# Patient Record
Sex: MALE | Race: WHITE
[De-identification: names, ages, dates, MRNs, and addresses within clinical notes are randomized per-mention and may not be internally consistent; named-entity substitution may affect disease eponyms.]

---

## 2018-05-02 ENCOUNTER — HOSPITAL ENCOUNTER (EMERGENCY)
Dept: HOSPITAL 45 - C.EDB | Age: 63
Discharge: HOME | End: 2018-05-02
Payer: COMMERCIAL

## 2018-05-02 VITALS
BODY MASS INDEX: 24.22 KG/M2 | WEIGHT: 154.32 LBS | HEIGHT: 67.01 IN | HEIGHT: 67.01 IN | BODY MASS INDEX: 24.22 KG/M2 | WEIGHT: 154.32 LBS

## 2018-05-02 VITALS — SYSTOLIC BLOOD PRESSURE: 151 MMHG | DIASTOLIC BLOOD PRESSURE: 96 MMHG | OXYGEN SATURATION: 95 % | HEART RATE: 92 BPM

## 2018-05-02 VITALS — TEMPERATURE: 97.88 F

## 2018-05-02 DIAGNOSIS — Z23: ICD-10-CM

## 2018-05-02 DIAGNOSIS — S50.812A: ICD-10-CM
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
## 2018-05-02 DIAGNOSIS — F17.210: ICD-10-CM

## 2018-05-02 DIAGNOSIS — S80.12XA: Primary | ICD-10-CM
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
## 2018-05-02 DIAGNOSIS — S70.312A: ICD-10-CM

## 2018-05-02 NOTE — EMERGENCY ROOM VISIT NOTE
History


Report prepared by Radhaibjennifer:  Dale Green


Under the Supervision of:  Dr. Roosevelt Asher M.D.


First contact with patient:  09:50


Chief Complaint:  LEG PAIN,LEG INJURY


Stated Complaint:  POSSIBLE BROKEN LEG - LEFT





History of Present Illness


The patient is a 63 year old male who presents to the Emergency Room with 

complaints of constant left leg pain beginning 45 minutes ago. He states that 

he was sawing logs when one of them fell onto his leg. He states that the log 

was partially rotten, and rolled out of the hold once it was partially cut. The 

patient states that he was hit by a sunny on the log. He states that the 

sunny cut his right and left forearms during the event. He has been unable to 

walk since the episode due to pain. The patient states that his toes were 

tingling following the injury, but this is improving. He also reports having 

the wind knocked out of him due to the pain in his leg. He rates his pain as an 

8.5/10 in severity. The patient denies chest pain, abdominal pain, or headache. 

He was wearing a hard hat and gloves.





   Source of History:  patient


   Onset:  45 minutes ago


   Position:  leg (left)


   Symptom Intensity:  8.5/10


   Timing:  constant


   Associated Symptoms:  No headache, No chest pain, No abdominal pain


Note:


The patient states that his toes were tingling following the injury, but this 

is improving.





Review of Systems


See HPI for pertinent positives and negatives.  A total of ten systems were 

reviewed and were otherwise negative.





Past Medical & Surgical


Medical Problems:


(1) No Known Active Medical Problems








Family History


No pertinent family history stated.





Social History


Smoking Status:  Current Every Day Smoker


Alcohol Use:  occasionally





Current/Historical Medications


Scheduled PRN


Oxycodone Ir (Roxicodone Ir), 1-2 TAB PO Q4H PRN for Pain





Allergies


Coded Allergies:  


     No Known Allergies (Unverified , 5/2/18)





Physical Exam


Vital Signs











  Date Time  Temp Pulse Resp B/P (MAP) Pulse Ox O2 Delivery O2 Flow Rate FiO2


 


5/2/18 11:17  92 16 151/96 95   


 


5/2/18 09:47 36.6 81 18 152/81 98 Room Air  











Physical Exam


GENERAL: Awake, alert, non-ill appearing, in no acute distress


HEAD: Normocephalic, atraumatic. No nina sign. No raccoon eyes.


EYES: Normal conjunctiva. PERRL.


EARS: External ears normal. Right TM normal. Left TM normal.


NOSE: Atraumatic


OROPHARYNX: Lips, tongue, and mucosa unremarkable. No erythema or exudate.


NECK: No tracheal deviation or JVD. No posterior midline tenderness. No step 

offs noted.


RESPIRATORY: CTA bilaterally.  Breath sounds equal.  No wheezes. No rhonchi. 

Normal respiratory effort.


CARDIAC: Normal rate, normal rhythm. No murmurs. No rubs.


ABDOMEN: Inspection reveals no abnormalities. Soft, non distended. No 

tenderness to palpation. No hernias.


BACK: No midline step offs or tenderness to palpation. Unremarkable.


PELVIS: Stable to rock.


SKIN: Normal.


LYMPH: No adenopathy.


MUSCULOSKELETAL: Small abrasion to the right wrist on the volar aspect. No bony 

deformity. Abrasion to the left proximal forearm on the volar and dorsal 

aspect. Right lower extremity is atraumatic. Left hip is non-tender with range 

of motion. Abrasion to the lateral left thigh down to the proximal lower leg. 

Maximal tenderness to the distal left quadriceps/IT band region. No knee joint 

tenderness or effusion. Remainder of left lower extremity is atraumatic. 


NEURO: GCS 15. Normal sensorium. No sensory or motor deficits noted.





Medical Decision & Procedures


ER Provider


Diagnostic Interpretation:


Radiology results as stated below per my review and radiologist interpretation: 





L FEMUR 2 VIEWS ROUTINE





DISCUSSION: Significant degenerative change left hip. Subchondral cystic


formation is seen at the femoral head as well as acetabulum. The femur


specifically is unremarkable. Cortical margins are intact. There is no abnormal


periosteal reaction. There is no evidence for soft tissue swelling.





IMPRESSION: 


1. Degenerative change left hip.


2. No acute process of the remainder the femur.





The above report was generated using voice recognition software.  It may contain


grammatical, syntax or spelling errors.





Electronically signed by:  Zack Valdivia M.D.


5/2/2018 10:34 AM





Medications Administered











 Medications


  (Trade)  Dose


 Ordered  Sig/Pietro


 Route  Start Time


 Stop Time Status Last Admin


Dose Admin


 


 Acetaminophen/


 Hydrocodone Bitart


  (Norco 5/325 Tab)  1 tab  NOW  STAT


 PO  5/2/18 09:59


 5/2/18 10:00 DC 5/2/18 10:11


1 TAB


 


 Diphtheria/


 Pertussis/Tetanus


 Vacc


  (Adacel Inj)  0.5 ml  ONCE ONCE


 IM.  5/2/18 10:00


 5/2/18 10:01 DC 5/2/18 10:13


0.5 ML











ED Course


0952: The patient was evaluated in room A4B. A complete history and physical 

exam was performed.\





0959: Ordered Norco 5/325 Tab PO.





1000: Ordered Adacel Inj 0.5 mL IM. 





1102: I reevaluated the patient. Discussed results and discharge instructions: 

he verbalized understanding and agreement. The patient is ready for discharge.





Medical Decision


Prior records reviewed and summarized above.





Triage Nursing notes reviewed and agree them.





Additional history obtained from his coworker.





The patient's history was concerning for traumatic injury.  





Differential diagnosis:


Etiologies such as fracture, dislocation, neurovascular compromise, compartment 

syndrome, soft tissue injury,  as well as others were entertained.  





Physical examination:


Consistent with an isolated extremity injuries.  The upper extremity for minor.





ER treatment provided:


Oral Norco


Ice packs


On reassessment the patient felt better.





Diagnostics interpreted by me:


Labs deferred.


Imaging studies:


Xrays as above.





Patient is doing well.  He has abrasions and a moderate contusion to the 

lateral quadriceps area.  There is no significant hematoma at this time.  He 

has crutches at home. 


By the evaluation outlined above emergent etiologies such as  fracture, 

dislocation, neurovascular compromise, compartment syndrome, infections, as 

well as others were deemed relatively unlikely.  


The patient was informed about the findings as listed above.  All questions 

were answered and he was very pleased with the treatment.  Return instructions 

were outlined and the patient was discharged in stable condition.





Prescription management:


Oxy IR





Referral: 


Follow-up with your primary care physician in 2 to 3 days for a recheck of your 

current condition.





[place] Drug Monitoring Program


Search Results:  patient reviewed within database, no issues identified





Medication Reconcilliation


Current Medication List:  was personally reviewed by me





Blood Pressure Screening


Patient's blood pressure:  Elevated blood pressure


Blood pressure disposition:  Referred to PCP





Impression





 Primary Impression:  


 Contusion of left leg


 Additional Impressions:  


 Abrasion of left leg


 Arm contusion


 Arm abrasion





Scribe Attestation


The scribe's documentation has been prepared under my direction and personally 

reviewed by me in its entirety. I confirm that the note above accurately 

reflects all work, treatment, procedures, and medical decision making performed 

by me.





Departure Information


Dispostion


Home / Self-Care





Prescriptions





Oxycodone Ir (Roxicodone Ir) 5 Mg Tab


1-2 TAB PO Q4H Y for Pain, #12 TAB


   Prov: Roosevelt Asher MD         5/2/18





Forms


HOME CARE DOCUMENTATION FORM,                                                 

               IMPORTANT VISIT INFORMATION





Patient Instructions


My [facility]





Additional Instructions





DO NOT drive, drink alcohol, operate machinery, or perform dangerous activities 

today.  You were given medications in the ER that can affect your ability to 

safely function or operate a vehicle.





Oxycodone (OxyIR) 5mg: Take 1-2 pills every four hours for breakthrough pain.  

Avoid alcohol, operating machinery or dangerous equipment, working on ladders 

or roofs, DRIVING, or situations where being under the influence may be 

dangerous.  It is recommended to use an over-the-counter stool softener such as 

Colace, 100mg twice daily while taking this medication to avoid constipation. 





Ibuprofen(Motrin, Advil) may be used for fever or pain.  Use 600mg every six 

hours as needed.  Take with food.  Avoid using more than 2400mg in a 24 hour 

period.  Do not use 2400mg per day for more than three consecutive days without 

physician direction.  Prolonged inappropriate use can lead to stomach upset or 

ulcers.  


(AND/OR)


Acetaminophen(Tylenol) may be used for fever or pain.  Use 1000mg every six 

hours as needed.  Avoid using more than 4000mg in a 24 hour period.  





Ice compresses for 20 minutes at a time four times daily for 2-3 days.





Use the crutches as instructed. 





Rest and elevate your injury.





Return to the ER immediately for any numbness, tingling, severe pain, extreme 

swelling in the extremity or as needed.





Follow-up with your primary care physician in 2 to 3 days for a recheck of your 

current condition.





Problem Qualifiers

## 2018-05-02 NOTE — DIAGNOSTIC IMAGING REPORT
L FEMUR 2 VIEWS ROUTINE



CLINICAL HISTORY: pain,left lateral impact from log pain



COMPARISON: None.



DISCUSSION: Significant degenerative change left hip. Subchondral cystic

formation is seen at the femoral head as well as acetabulum. The femur

specifically is unremarkable. Cortical margins are intact. There is no abnormal

periosteal reaction. There is no evidence for soft tissue swelling.



IMPRESSION: 

1. Degenerative change left hip.

2. No acute process of the remainder the femur.











The above report was generated using voice recognition software.  It may contain

grammatical, syntax or spelling errors.







Electronically signed by:  Zack Valdivia M.D.

5/2/2018 10:34 AM



Dictated Date/Time:  5/2/2018 10:33 AM